# Patient Record
Sex: MALE | Race: WHITE | Employment: FULL TIME | URBAN - METROPOLITAN AREA
[De-identification: names, ages, dates, MRNs, and addresses within clinical notes are randomized per-mention and may not be internally consistent; named-entity substitution may affect disease eponyms.]

---

## 2017-02-03 ENCOUNTER — TELEPHONE (OUTPATIENT)
Dept: INTERNAL MEDICINE CLINIC | Age: 32
End: 2017-02-03

## 2017-03-08 ENCOUNTER — INITIAL CONSULT (OUTPATIENT)
Dept: NEUROLOGY | Age: 32
End: 2017-03-08

## 2017-03-08 VITALS
SYSTOLIC BLOOD PRESSURE: 121 MMHG | HEART RATE: 73 BPM | WEIGHT: 253 LBS | BODY MASS INDEX: 32.47 KG/M2 | OXYGEN SATURATION: 97 % | HEIGHT: 74 IN | DIASTOLIC BLOOD PRESSURE: 84 MMHG

## 2017-03-08 DIAGNOSIS — R20.2 RIGHT HAND PARESTHESIA: Primary | ICD-10-CM

## 2017-03-08 DIAGNOSIS — M79.2 NEURITIS OF UPPER EXTREMITY: ICD-10-CM

## 2017-03-08 PROCEDURE — 99243 OFF/OP CNSLTJ NEW/EST LOW 30: CPT | Performed by: PSYCHIATRY & NEUROLOGY

## 2017-04-04 ENCOUNTER — EMPLOYEE WELLNESS (OUTPATIENT)
Dept: OTHER | Age: 32
End: 2017-04-04

## 2017-04-04 LAB
CHOLESTEROL, TOTAL: 172 MG/DL (ref 0–199)
GLUCOSE BLD-MCNC: 88 MG/DL (ref 70–99)
HDLC SERPL-MCNC: 46 MG/DL (ref 40–60)
LDL CHOLESTEROL CALCULATED: 109 MG/DL
TRIGL SERPL-MCNC: 85 MG/DL (ref 0–150)

## 2017-04-18 ENCOUNTER — HOSPITAL ENCOUNTER (OUTPATIENT)
Dept: NEUROLOGY | Age: 32
Discharge: HOME | End: 2017-04-18
Attending: PSYCHIATRY & NEUROLOGY | Admitting: PSYCHIATRY & NEUROLOGY

## 2017-04-18 LAB
FOLATE: 13.71 NG/ML (ref 4.78–24.2)
VITAMIN B-12: 860 PG/ML (ref 211–911)

## 2017-05-05 ENCOUNTER — OFFICE VISIT (OUTPATIENT)
Dept: NEUROLOGY | Age: 32
End: 2017-05-05

## 2017-05-05 VITALS
DIASTOLIC BLOOD PRESSURE: 83 MMHG | BODY MASS INDEX: 31.44 KG/M2 | OXYGEN SATURATION: 97 % | WEIGHT: 245 LBS | HEIGHT: 74 IN | SYSTOLIC BLOOD PRESSURE: 124 MMHG | HEART RATE: 90 BPM

## 2017-05-05 DIAGNOSIS — M79.2 NEURITIS OF UPPER EXTREMITY: ICD-10-CM

## 2017-05-05 DIAGNOSIS — G56.01 CARPAL TUNNEL SYNDROME OF RIGHT WRIST: ICD-10-CM

## 2017-05-05 DIAGNOSIS — R20.2 RIGHT HAND PARESTHESIA: Primary | ICD-10-CM

## 2017-05-05 PROCEDURE — 99212 OFFICE O/P EST SF 10 MIN: CPT | Performed by: PSYCHIATRY & NEUROLOGY

## 2017-06-05 ENCOUNTER — OFFICE VISIT (OUTPATIENT)
Dept: INTERNAL MEDICINE CLINIC | Age: 32
End: 2017-06-05

## 2017-06-05 ENCOUNTER — TELEPHONE (OUTPATIENT)
Dept: INTERNAL MEDICINE CLINIC | Age: 32
End: 2017-06-05

## 2017-06-05 VITALS
SYSTOLIC BLOOD PRESSURE: 120 MMHG | BODY MASS INDEX: 31.7 KG/M2 | HEIGHT: 74 IN | WEIGHT: 247 LBS | RESPIRATION RATE: 18 BRPM | DIASTOLIC BLOOD PRESSURE: 68 MMHG | HEART RATE: 75 BPM

## 2017-06-05 DIAGNOSIS — R20.0 HAND NUMBNESS: Primary | ICD-10-CM

## 2017-06-05 DIAGNOSIS — M79.2 NEURITIS OF UPPER EXTREMITY: ICD-10-CM

## 2017-06-05 PROCEDURE — 99213 OFFICE O/P EST LOW 20 MIN: CPT | Performed by: INTERNAL MEDICINE

## 2017-06-05 ASSESSMENT — PATIENT HEALTH QUESTIONNAIRE - PHQ9
SUM OF ALL RESPONSES TO PHQ9 QUESTIONS 1 & 2: 0
1. LITTLE INTEREST OR PLEASURE IN DOING THINGS: 0
2. FEELING DOWN, DEPRESSED OR HOPELESS: 0
SUM OF ALL RESPONSES TO PHQ QUESTIONS 1-9: 0

## 2017-06-29 ENCOUNTER — TELEPHONE (OUTPATIENT)
Dept: INTERNAL MEDICINE CLINIC | Age: 32
End: 2017-06-29

## 2017-07-31 ENCOUNTER — OFFICE VISIT (OUTPATIENT)
Dept: ORTHOPEDIC SURGERY | Age: 32
End: 2017-07-31

## 2017-07-31 VITALS
HEIGHT: 74 IN | DIASTOLIC BLOOD PRESSURE: 77 MMHG | SYSTOLIC BLOOD PRESSURE: 122 MMHG | WEIGHT: 248 LBS | HEART RATE: 58 BPM | BODY MASS INDEX: 31.83 KG/M2

## 2017-07-31 DIAGNOSIS — M25.561 ACUTE PAIN OF BOTH KNEES: Primary | ICD-10-CM

## 2017-07-31 DIAGNOSIS — M25.562 ACUTE PAIN OF BOTH KNEES: Primary | ICD-10-CM

## 2017-07-31 PROCEDURE — 73564 X-RAY EXAM KNEE 4 OR MORE: CPT | Performed by: PHYSICIAN ASSISTANT

## 2017-07-31 PROCEDURE — 99203 OFFICE O/P NEW LOW 30 MIN: CPT | Performed by: PHYSICIAN ASSISTANT

## 2017-08-08 ENCOUNTER — TELEPHONE (OUTPATIENT)
Dept: ORTHOPEDIC SURGERY | Age: 32
End: 2017-08-08

## 2017-08-09 ENCOUNTER — OFFICE VISIT (OUTPATIENT)
Dept: ORTHOPEDIC SURGERY | Age: 32
End: 2017-08-09

## 2017-08-09 VITALS — WEIGHT: 248 LBS | BODY MASS INDEX: 31.83 KG/M2 | HEIGHT: 74 IN

## 2017-08-09 DIAGNOSIS — S83.411A SPRAIN OF MEDIAL COLLATERAL LIGAMENT OF RIGHT KNEE, INITIAL ENCOUNTER: ICD-10-CM

## 2017-08-09 DIAGNOSIS — S83.281A TEAR OF LATERAL MENISCUS OF RIGHT KNEE, UNSPECIFIED TEAR TYPE, UNSPECIFIED WHETHER OLD OR CURRENT TEAR, INITIAL ENCOUNTER: ICD-10-CM

## 2017-08-09 DIAGNOSIS — S83.242A TEAR OF MEDIAL MENISCUS OF LEFT KNEE, UNSPECIFIED TEAR TYPE, UNSPECIFIED WHETHER OLD OR CURRENT TEAR, INITIAL ENCOUNTER: Primary | ICD-10-CM

## 2017-08-09 PROCEDURE — 99213 OFFICE O/P EST LOW 20 MIN: CPT | Performed by: ORTHOPAEDIC SURGERY

## 2017-08-09 PROCEDURE — L1812 KO ELASTIC W/JOINTS PRE OTS: HCPCS | Performed by: ORTHOPAEDIC SURGERY

## 2017-09-27 ENCOUNTER — OFFICE VISIT (OUTPATIENT)
Dept: ORTHOPEDIC SURGERY | Age: 32
End: 2017-09-27

## 2017-09-27 VITALS
HEART RATE: 74 BPM | DIASTOLIC BLOOD PRESSURE: 71 MMHG | BODY MASS INDEX: 31.83 KG/M2 | WEIGHT: 248 LBS | SYSTOLIC BLOOD PRESSURE: 128 MMHG | HEIGHT: 74 IN

## 2017-09-27 DIAGNOSIS — M25.562 ACUTE PAIN OF BOTH KNEES: Primary | ICD-10-CM

## 2017-09-27 DIAGNOSIS — M25.561 ACUTE PAIN OF BOTH KNEES: Primary | ICD-10-CM

## 2017-09-27 PROCEDURE — 99214 OFFICE O/P EST MOD 30 MIN: CPT | Performed by: ORTHOPAEDIC SURGERY

## 2018-01-10 ENCOUNTER — OFFICE VISIT (OUTPATIENT)
Dept: ORTHOPEDIC SURGERY | Age: 33
End: 2018-01-10

## 2018-01-10 VITALS — WEIGHT: 248 LBS | HEIGHT: 74 IN | BODY MASS INDEX: 31.83 KG/M2

## 2018-01-10 DIAGNOSIS — S83.281A TEAR OF LATERAL MENISCUS OF RIGHT KNEE, UNSPECIFIED TEAR TYPE, UNSPECIFIED WHETHER OLD OR CURRENT TEAR, INITIAL ENCOUNTER: Primary | ICD-10-CM

## 2018-01-10 PROCEDURE — 99213 OFFICE O/P EST LOW 20 MIN: CPT | Performed by: ORTHOPAEDIC SURGERY

## 2018-02-03 ENCOUNTER — HOSPITAL ENCOUNTER (OUTPATIENT)
Dept: OTHER | Age: 33
Discharge: HOME | End: 2018-02-03
Attending: INTERNAL MEDICINE | Admitting: INTERNAL MEDICINE

## 2018-02-04 LAB
ESTIMATED AVERAGE GLUCOSE: 99.7 MG/DL
ESTRADIOL LEVEL: 15 PG/ML
HBA1C MFR BLD: 5.1 %
HCT VFR BLD CALC: 46.6 % (ref 40.5–52.5)
HEMOGLOBIN: 15.3 G/DL (ref 13.5–17.5)
IGF-1 (INSULIN-LIKE GROWTH I): 203 NG/ML (ref 82–242)
INSULIN-LIKE GROWTH FACTOR-1 Z-SCORE: 1
MCH RBC QN AUTO: 30 PG (ref 26–34)
MCHC RBC AUTO-ENTMCNC: 32.8 G/DL (ref 31–36)
MCV RBC AUTO: 91.5 FL (ref 80–100)
MUV IGG SER QL: 97.6 AU/ML
PDW BLD-RTO: 13.3 % (ref 12.4–15.4)
PLATELET # BLD: 350 K/UL (ref 135–450)
PMV BLD AUTO: 9.4 FL (ref 5–10.5)
PROSTATE SPECIFIC ANTIGEN: 0.26 NG/ML (ref 0–4)
RBC # BLD: 5.1 M/UL (ref 4.2–5.9)
WBC # BLD: 6.8 K/UL (ref 4–11)

## 2018-02-05 LAB — DHEA: 4.95 NG/ML (ref 1.33–7.78)

## 2018-02-06 LAB — MUMPS IGM ANTIBODY: 0.37 IV

## 2018-02-08 LAB
DHEAS (DHEA SULFATE): 232 UG/DL (ref 120–520)
SEX HORMONE BINDING GLOBULIN: 42 NMOL/L (ref 11–80)
TESTOSTERONE FREE-NONMALE: 61.9 PG/ML (ref 47–244)
TESTOSTERONE TOTAL: 359 NG/DL (ref 220–1000)

## 2018-02-28 ENCOUNTER — HOSPITAL ENCOUNTER (OUTPATIENT)
Dept: OTHER | Age: 33
Discharge: HOME | End: 2018-02-28
Attending: INTERNAL MEDICINE | Admitting: INTERNAL MEDICINE

## 2018-03-01 LAB
ESTRADIOL LEVEL: 40 PG/ML
HCT VFR BLD CALC: 42.7 % (ref 40.5–52.5)
HEMOGLOBIN: 14.8 G/DL (ref 13.5–17.5)
MCH RBC QN AUTO: 31.3 PG (ref 26–34)
MCHC RBC AUTO-ENTMCNC: 34.7 G/DL (ref 31–36)
MCV RBC AUTO: 89.9 FL (ref 80–100)
PDW BLD-RTO: 13.3 % (ref 12.4–15.4)
PLATELET # BLD: 269 K/UL (ref 135–450)
PMV BLD AUTO: 9.2 FL (ref 5–10.5)
PROSTATE SPECIFIC ANTIGEN: 0.27 NG/ML (ref 0–4)
RBC # BLD: 4.75 M/UL (ref 4.2–5.9)
WBC # BLD: 8.2 K/UL (ref 4–11)

## 2018-03-03 LAB
SEX HORMONE BINDING GLOBULIN: 30 NMOL/L (ref 11–80)
TESTOSTERONE FREE-NONMALE: 111.4 PG/ML (ref 47–244)
TESTOSTERONE TOTAL: 502 NG/DL (ref 220–1000)

## 2018-03-20 VITALS — WEIGHT: 244 LBS | BODY MASS INDEX: 31.33 KG/M2

## 2018-03-21 ENCOUNTER — HOSPITAL ENCOUNTER (OUTPATIENT)
Dept: OTHER | Age: 33
Discharge: HOME | End: 2018-03-21
Attending: INTERNAL MEDICINE | Admitting: INTERNAL MEDICINE

## 2018-03-21 LAB
BILIRUBIN URINE: ABNORMAL
BLOOD, URINE: ABNORMAL
CLARITY: CLEAR
COLOR: YELLOW
EPITHELIAL CELLS, UA: ABNORMAL /HPF
FOLLICLE STIMULATING HORMONE: 1.1 MIU/ML
GLUCOSE URINE: NEGATIVE MG/DL
HCT VFR BLD CALC: 46.2 % (ref 40.5–52.5)
HEMOGLOBIN: 16 G/DL (ref 13.5–17.5)
KETONES, URINE: 15 MG/DL
LEUKOCYTE ESTERASE, URINE: NEGATIVE
LUTEINIZING HORMONE: 1.2 MIU/ML
MCH RBC QN AUTO: 30.7 PG (ref 26–34)
MCHC RBC AUTO-ENTMCNC: 34.7 G/DL (ref 31–36)
MCV RBC AUTO: 88.6 FL (ref 80–100)
MICROSCOPIC EXAMINATION: YES
MUCUS: ABNORMAL /LPF
NITRITE, URINE: NEGATIVE
PDW BLD-RTO: 13.4 % (ref 12.4–15.4)
PH UA: 6
PLATELET # BLD: 304 K/UL (ref 135–450)
PMV BLD AUTO: 8.3 FL (ref 5–10.5)
PROSTATE SPECIFIC ANTIGEN: 0.29 NG/ML (ref 0–4)
PROTEIN UA: NEGATIVE MG/DL
RBC # BLD: 5.22 M/UL (ref 4.2–5.9)
RBC UA: ABNORMAL /HPF (ref 0–2)
SPECIFIC GRAVITY UA: >=1.03
URINE TYPE: ABNORMAL
UROBILINOGEN, URINE: 0.2 E.U./DL
WBC # BLD: 10.4 K/UL (ref 4–11)
WBC UA: ABNORMAL /HPF (ref 0–5)

## 2018-03-23 LAB — URINE CULTURE, ROUTINE: NORMAL

## 2018-03-24 LAB
SEX HORMONE BINDING GLOBULIN: 37 NMOL/L (ref 11–80)
TESTOSTERONE FREE-NONMALE: 56.3 PG/ML (ref 47–244)
TESTOSTERONE TOTAL: 306 NG/DL (ref 220–1000)

## 2018-03-27 ENCOUNTER — NURSE TRIAGE (OUTPATIENT)
Dept: OTHER | Facility: CLINIC | Age: 33
End: 2018-03-27

## 2018-03-27 ENCOUNTER — TELEPHONE (OUTPATIENT)
Dept: INTERNAL MEDICINE CLINIC | Age: 33
End: 2018-03-27

## 2018-03-27 ENCOUNTER — OFFICE VISIT (OUTPATIENT)
Dept: INTERNAL MEDICINE CLINIC | Age: 33
End: 2018-03-27

## 2018-03-27 VITALS — BODY MASS INDEX: 29.26 KG/M2 | HEIGHT: 74 IN | WEIGHT: 228 LBS

## 2018-03-27 DIAGNOSIS — N48.89 PENIS PAIN: Primary | ICD-10-CM

## 2018-03-27 PROCEDURE — 99212 OFFICE O/P EST SF 10 MIN: CPT | Performed by: INTERNAL MEDICINE

## 2018-03-27 RX ORDER — PREDNISONE 10 MG/1
TABLET ORAL
Qty: 20 TABLET | Refills: 0 | Status: SHIPPED | OUTPATIENT
Start: 2018-03-27 | End: 2018-05-16 | Stop reason: SDUPTHER

## 2018-03-27 NOTE — TELEPHONE ENCOUNTER
Reason for Disposition   Sounds like a serious injury to the triager   Patient sounds very sick or weak to the triager    Protocols used: GENITAL INJURY - MALE-ADULT-AH, NEUROLOGIC DEFICIT-ADULT-AH  Patient on way to ED. Reports injury to groin area early March. Symptoms intensified this evening. Patient woke to SOB, numbness, tingling moving up his spine, hands - red, numb, unable to make a fist.  Unable to have erection. Recommended to proceed to the ED.

## 2018-03-27 NOTE — TELEPHONE ENCOUNTER
----- Message from Fabienne Boyer MD sent at 3/27/2018  2:43 PM EDT -----  Contact: [phone]  Add mobic 15 mg daily  Let me talk to him  ----- Message -----  From: Wolf Zurita  Sent: 3/27/2018  10:16 AM  To: Fabienne Boyer MD    He has been having a lot of lower back pain for 2 1/2 wks now-he was offered an appt . tomm.  But only wanted to see you -wanted to know if you could squeeze him in sometime this afternoon -please let him know at  [phone] appt- [id number]-lr

## 2018-03-28 ENCOUNTER — TELEPHONE (OUTPATIENT)
Dept: INTERNAL MEDICINE CLINIC | Age: 33
End: 2018-03-28

## 2018-03-28 NOTE — TELEPHONE ENCOUNTER
----- Message from Davey Payne MD sent at 3/28/2018  3:23 PM EDT -----  18    ----- Message -----  From: Martha Cornelius  Sent: 3/28/2018   1:54 PM  To: Davey Payne MD    Pharmacy wanting clarify quantity on pt's prednisone. You sent 20 but states pt only needs 18. Ok to change?

## 2018-04-02 RX ORDER — DIAZEPAM 2 MG/1
2 TABLET ORAL EVERY 12 HOURS PRN
Qty: 20 TABLET | Refills: 0 | Status: SHIPPED | OUTPATIENT
Start: 2018-04-02 | End: 2018-04-12

## 2018-05-16 ENCOUNTER — TELEPHONE (OUTPATIENT)
Dept: INTERNAL MEDICINE CLINIC | Age: 33
End: 2018-05-16

## 2018-05-16 RX ORDER — PREDNISONE 10 MG/1
TABLET ORAL
Qty: 20 TABLET | Refills: 0 | Status: SHIPPED | OUTPATIENT
Start: 2018-05-16 | End: 2018-05-31 | Stop reason: DRUGHIGH

## 2018-05-18 ENCOUNTER — EMPLOYEE WELLNESS (OUTPATIENT)
Dept: OTHER | Age: 33
End: 2018-05-18

## 2018-05-19 LAB
CHOLESTEROL, TOTAL: 183 MG/DL (ref 0–199)
GLUCOSE BLD-MCNC: 74 MG/DL (ref 70–99)
HDLC SERPL-MCNC: 53 MG/DL (ref 40–60)
LDL CHOLESTEROL CALCULATED: 117 MG/DL
TRIGL SERPL-MCNC: 66 MG/DL (ref 0–150)

## 2018-05-25 ENCOUNTER — TELEPHONE (OUTPATIENT)
Dept: ENT CLINIC | Age: 33
End: 2018-05-25

## 2018-05-29 VITALS — BODY MASS INDEX: 30.3 KG/M2 | WEIGHT: 236 LBS

## 2018-05-31 ENCOUNTER — OFFICE VISIT (OUTPATIENT)
Dept: ENT CLINIC | Age: 33
End: 2018-05-31

## 2018-05-31 ENCOUNTER — OFFICE VISIT (OUTPATIENT)
Dept: AUDIOLOGY | Age: 33
End: 2018-05-31

## 2018-05-31 VITALS
DIASTOLIC BLOOD PRESSURE: 73 MMHG | HEART RATE: 64 BPM | BODY MASS INDEX: 28.88 KG/M2 | WEIGHT: 225 LBS | HEIGHT: 74 IN | SYSTOLIC BLOOD PRESSURE: 118 MMHG

## 2018-05-31 DIAGNOSIS — H91.23 SUDDEN HEARING LOSS OF BOTH EARS: Primary | ICD-10-CM

## 2018-05-31 DIAGNOSIS — H91.22 SUDDEN IDIOPATHIC HEARING LOSS OF LEFT EAR, UNSPECIFIED HEARING STATUS ON CONTRALATERAL SIDE: Primary | ICD-10-CM

## 2018-05-31 DIAGNOSIS — H93.11 TINNITUS OF RIGHT EAR: Chronic | ICD-10-CM

## 2018-05-31 PROCEDURE — 92550 TYMPANOMETRY & REFLEX THRESH: CPT | Performed by: AUDIOLOGIST

## 2018-05-31 PROCEDURE — 92557 COMPREHENSIVE HEARING TEST: CPT | Performed by: AUDIOLOGIST

## 2018-05-31 PROCEDURE — 99214 OFFICE O/P EST MOD 30 MIN: CPT | Performed by: OTOLARYNGOLOGY

## 2018-05-31 RX ORDER — PREDNISONE 10 MG/1
TABLET ORAL
Qty: 78 TABLET | Refills: 0 | Status: SHIPPED | OUTPATIENT
Start: 2018-05-31 | End: 2020-01-17 | Stop reason: ALTCHOICE

## 2018-06-04 ENCOUNTER — TELEPHONE (OUTPATIENT)
Dept: ENT CLINIC | Age: 33
End: 2018-06-04

## 2018-10-02 ENCOUNTER — TELEPHONE (OUTPATIENT)
Dept: INTERNAL MEDICINE CLINIC | Age: 33
End: 2018-10-02

## 2018-10-02 DIAGNOSIS — M54.9 BACK PAIN, UNSPECIFIED BACK LOCATION, UNSPECIFIED BACK PAIN LATERALITY, UNSPECIFIED CHRONICITY: Primary | ICD-10-CM

## 2018-10-08 ENCOUNTER — OFFICE VISIT (OUTPATIENT)
Dept: INTERNAL MEDICINE CLINIC | Age: 33
End: 2018-10-08

## 2018-10-08 VITALS
BODY MASS INDEX: 31.06 KG/M2 | HEART RATE: 70 BPM | RESPIRATION RATE: 18 BRPM | WEIGHT: 242 LBS | DIASTOLIC BLOOD PRESSURE: 75 MMHG | SYSTOLIC BLOOD PRESSURE: 125 MMHG | HEIGHT: 74 IN

## 2018-10-08 DIAGNOSIS — M79.18 MYALGIA OF PELVIC FLOOR: Primary | ICD-10-CM

## 2018-10-08 DIAGNOSIS — F41.9 ANXIETY: ICD-10-CM

## 2018-10-08 PROCEDURE — 99212 OFFICE O/P EST SF 10 MIN: CPT | Performed by: INTERNAL MEDICINE

## 2018-10-08 RX ORDER — CYCLOBENZAPRINE HCL 5 MG
5 TABLET ORAL DAILY PRN
Qty: 30 TABLET | Refills: 0 | Status: SHIPPED | OUTPATIENT
Start: 2018-10-08 | End: 2018-10-18

## 2019-02-23 ENCOUNTER — HOSPITAL ENCOUNTER (OUTPATIENT)
Age: 34
Discharge: HOME | End: 2019-02-23
Payer: COMMERCIAL

## 2019-02-23 PROCEDURE — 84403 ASSAY OF TOTAL TESTOSTERONE: CPT

## 2019-02-23 PROCEDURE — 84439 ASSAY OF FREE THYROXINE: CPT

## 2019-02-23 PROCEDURE — 83002 ASSAY OF GONADOTROPIN (LH): CPT

## 2019-02-23 PROCEDURE — 83001 ASSAY OF GONADOTROPIN (FSH): CPT

## 2019-02-23 PROCEDURE — 80069 RENAL FUNCTION PANEL: CPT

## 2019-02-23 PROCEDURE — 84153 ASSAY OF PSA TOTAL: CPT

## 2019-02-23 PROCEDURE — 36415 COLL VENOUS BLD VENIPUNCTURE: CPT

## 2019-02-23 PROCEDURE — 84443 ASSAY THYROID STIM HORMONE: CPT

## 2019-02-23 PROCEDURE — 84480 ASSAY TRIIODOTHYRONINE (T3): CPT

## 2019-02-23 PROCEDURE — 85027 COMPLETE CBC AUTOMATED: CPT

## 2019-02-23 PROCEDURE — 84482 T3 REVERSE: CPT

## 2019-02-23 PROCEDURE — 84270 ASSAY OF SEX HORMONE GLOBUL: CPT

## 2019-02-24 LAB
ALBUMIN SERPL-MCNC: 4.5 G/DL (ref 3.4–5)
ANION GAP SERPL CALCULATED.3IONS-SCNC: 12 MMOL/L (ref 3–16)
BUN BLDV-MCNC: 17 MG/DL (ref 7–20)
CALCIUM SERPL-MCNC: 9.5 MG/DL (ref 8.3–10.6)
CHLORIDE BLD-SCNC: 101 MMOL/L (ref 99–110)
CO2: 26 MMOL/L (ref 21–32)
CREAT SERPL-MCNC: 1 MG/DL (ref 0.9–1.3)
FOLLICLE STIMULATING HORMONE: 2 MIU/ML
GFR AFRICAN AMERICAN: >60
GFR NON-AFRICAN AMERICAN: >60
GLUCOSE BLD-MCNC: 86 MG/DL (ref 70–99)
HCT VFR BLD CALC: 45 % (ref 40.5–52.5)
HEMOGLOBIN: 15.2 G/DL (ref 13.5–17.5)
LUTEINIZING HORMONE: 4.3 MIU/ML
MCH RBC QN AUTO: 30.9 PG (ref 26–34)
MCHC RBC AUTO-ENTMCNC: 33.8 G/DL (ref 31–36)
MCV RBC AUTO: 91.4 FL (ref 80–100)
PDW BLD-RTO: 13.2 % (ref 12.4–15.4)
PHOSPHORUS: 3.3 MG/DL (ref 2.5–4.9)
PLATELET # BLD: 231 K/UL (ref 135–450)
PMV BLD AUTO: 9.1 FL (ref 5–10.5)
POTASSIUM SERPL-SCNC: 5.1 MMOL/L (ref 3.5–5.1)
PROSTATE SPECIFIC ANTIGEN: 0.33 NG/ML (ref 0–4)
RBC # BLD: 4.93 M/UL (ref 4.2–5.9)
SODIUM BLD-SCNC: 139 MMOL/L (ref 136–145)
T3 TOTAL: 0.95 NG/ML (ref 0.8–2)
T4 FREE: 1.5 NG/DL (ref 0.9–1.8)
TSH SERPL DL<=0.05 MIU/L-ACNC: 0.93 UIU/ML (ref 0.27–4.2)
WBC # BLD: 5.8 K/UL (ref 4–11)

## 2019-02-27 LAB
SEX HORMONE BINDING GLOBULIN: 44 NMOL/L (ref 11–80)
T3 REVERSE: 17.9 NG/DL (ref 9–27)
TESTOSTERONE FREE-NONMALE: 115.8 PG/ML (ref 47–244)
TESTOSTERONE TOTAL: 635 NG/DL (ref 220–1000)

## 2019-04-03 ENCOUNTER — NURSE TRIAGE (OUTPATIENT)
Dept: OTHER | Facility: CLINIC | Age: 34
End: 2019-04-03

## 2019-04-03 NOTE — TELEPHONE ENCOUNTER
Reason for Disposition   Brief abdominal pain and no symptoms now (e.g., hit in stomach)   Mild abdominal pain    Protocols used: ABDOMINAL INJURY-ADULT-OH, ABDOMINAL PAIN - MALE-ADULT-OH    Patient's location of employment: [employer]  Location of injury: Recovery room  Time of injury: 1500  Last 4 of patient's SSN: [id number]  Location recommended for treatment: home care. 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 Pain was sharp at first now is mild 3/10. He is urinating ok. No pain or swelling in scrotum or penis. Not feeling ill.     He is filling out Safe [employer] and will notify supervisor in AM.

## 2019-05-21 ENCOUNTER — EMPLOYEE WELLNESS (OUTPATIENT)
Dept: OTHER | Age: 34
End: 2019-05-21

## 2019-05-21 LAB
CHOLESTEROL, TOTAL: 134 MG/DL (ref 0–199)
GLUCOSE BLD-MCNC: 79 MG/DL (ref 70–99)
HDLC SERPL-MCNC: 44 MG/DL (ref 40–60)
LDL CHOLESTEROL CALCULATED: 81 MG/DL
TRIGL SERPL-MCNC: 44 MG/DL (ref 0–150)

## 2019-05-28 VITALS — BODY MASS INDEX: 25.29 KG/M2 | WEIGHT: 197 LBS

## 2019-06-10 ENCOUNTER — HOSPITAL ENCOUNTER (OUTPATIENT)
Age: 34
Discharge: HOME | End: 2019-06-10
Payer: COMMERCIAL

## 2019-06-10 LAB
ALBUMIN SERPL-MCNC: 4.3 G/DL (ref 3.4–5)
ANION GAP SERPL CALCULATED.3IONS-SCNC: 13 MMOL/L (ref 3–16)
BUN BLDV-MCNC: 24 MG/DL (ref 7–20)
CALCIUM SERPL-MCNC: 9.3 MG/DL (ref 8.3–10.6)
CHLORIDE BLD-SCNC: 103 MMOL/L (ref 99–110)
CO2: 25 MMOL/L (ref 21–32)
CREAT SERPL-MCNC: 1.1 MG/DL (ref 0.9–1.3)
GFR AFRICAN AMERICAN: >60
GFR NON-AFRICAN AMERICAN: >60
GLUCOSE BLD-MCNC: 96 MG/DL (ref 70–99)
HCT VFR BLD CALC: 44.6 % (ref 40.5–52.5)
HEMOGLOBIN: 14.9 G/DL (ref 13.5–17.5)
MCH RBC QN AUTO: 30.6 PG (ref 26–34)
MCHC RBC AUTO-ENTMCNC: 33.5 G/DL (ref 31–36)
MCV RBC AUTO: 91.5 FL (ref 80–100)
PDW BLD-RTO: 13.9 % (ref 12.4–15.4)
PHOSPHORUS: 3.3 MG/DL (ref 2.5–4.9)
PLATELET # BLD: 248 K/UL (ref 135–450)
PMV BLD AUTO: 8.6 FL (ref 5–10.5)
POTASSIUM SERPL-SCNC: 4.8 MMOL/L (ref 3.5–5.1)
PROSTATE SPECIFIC ANTIGEN: 0.48 NG/ML (ref 0–4)
RBC # BLD: 4.87 M/UL (ref 4.2–5.9)
SODIUM BLD-SCNC: 141 MMOL/L (ref 136–145)
T3 TOTAL: 0.82 NG/ML (ref 0.8–2)
T4 FREE: 1.5 NG/DL (ref 0.9–1.8)
TSH SERPL DL<=0.05 MIU/L-ACNC: 2.45 UIU/ML (ref 0.27–4.2)
WBC # BLD: 6.2 K/UL (ref 4–11)

## 2019-06-10 PROCEDURE — 84153 ASSAY OF PSA TOTAL: CPT

## 2019-06-10 PROCEDURE — 84482 T3 REVERSE: CPT

## 2019-06-10 PROCEDURE — 85027 COMPLETE CBC AUTOMATED: CPT

## 2019-06-10 PROCEDURE — 80069 RENAL FUNCTION PANEL: CPT

## 2019-06-10 PROCEDURE — 36415 COLL VENOUS BLD VENIPUNCTURE: CPT

## 2019-06-10 PROCEDURE — 84439 ASSAY OF FREE THYROXINE: CPT

## 2019-06-10 PROCEDURE — 84443 ASSAY THYROID STIM HORMONE: CPT

## 2019-06-10 PROCEDURE — 84270 ASSAY OF SEX HORMONE GLOBUL: CPT

## 2019-06-10 PROCEDURE — 84403 ASSAY OF TOTAL TESTOSTERONE: CPT

## 2019-06-10 PROCEDURE — 84480 ASSAY TRIIODOTHYRONINE (T3): CPT

## 2019-06-11 ENCOUNTER — OFFICE VISIT (OUTPATIENT)
Dept: ORTHOPEDIC SURGERY | Age: 34
End: 2019-06-11
Payer: COMMERCIAL

## 2019-06-11 VITALS
WEIGHT: 197.09 LBS | SYSTOLIC BLOOD PRESSURE: 129 MMHG | DIASTOLIC BLOOD PRESSURE: 84 MMHG | HEIGHT: 74 IN | BODY MASS INDEX: 25.29 KG/M2 | HEART RATE: 84 BPM

## 2019-06-11 DIAGNOSIS — M54.2 NECK PAIN: Primary | ICD-10-CM

## 2019-06-11 DIAGNOSIS — M54.12 CERVICAL RADICULITIS: ICD-10-CM

## 2019-06-11 PROCEDURE — L0172 CERV COL SR FOAM 2PC PRE OTS: HCPCS | Performed by: PHYSICIAN ASSISTANT

## 2019-06-11 PROCEDURE — 99204 OFFICE O/P NEW MOD 45 MIN: CPT | Performed by: PHYSICIAN ASSISTANT

## 2019-06-11 RX ORDER — PREDNISONE 10 MG/1
TABLET ORAL
Qty: 26 TABLET | Refills: 0 | Status: SHIPPED | OUTPATIENT
Start: 2019-06-11 | End: 2020-01-17 | Stop reason: ALTCHOICE

## 2019-06-11 NOTE — LETTER
[facility] [place] [facility]  [address] [facility] [address]  Phone: [phone]  Fax: [phone]    Daniella Lizzie        June 11, 2019     Patient: Justin Nobles   YOB: 1985   Date of Visit: 6/11/2019       To Whom It May Concern: It is my medical opinion that Justin Nobles should remain out of work until 6-. If you have any questions or concerns, please don't hesitate to call.     Sincerely,        Ninfa Whittington PA-C

## 2019-06-11 NOTE — PROGRESS NOTES
New Patient: SPINE    CHIEF COMPLAINT:    Chief Complaint   Patient presents with    Neck Pain     Pain goes down left arm. 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 He states on Sunday he was in a crawl space and backed out hitting his head on a post which caused \"shooting\" pain into his neck, arms and to his mid thoracic spine. He currently describes aching and burning neck/trap pain with intermittent numbness affecting his left triceps forearm to the hand last 4 digits. Conservative care includes activity modification, NSAIDs, ice. He reports subjective upper and lower extremity weakness. Denies any fine motor difficulty. No gait instability. No lower extremity radiating symptoms. He denies any of these issues prior to this injury. Pain Assessment  Location of Pain: Neck  Severity of Pain: 5  Quality of Pain: Sharp, Dull, Aching  Duration of Pain: Persistent  Frequency of Pain: Constant  Aggravating Factors: Stairs, Walking, Standing, Squatting, Kneeling, Exercise, Straightening, Stretching, Bending  Limiting Behavior: Yes  Relieving Factors: Rest  Result of Injury: No  Work-Related Injury: No  Are there other pain locations you wish to document?: No    The pain assessment was noted & reviewed in the medical record today.      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 , Disp: , Rfl:     predniSONE (DELTASONE) 10 MG tablet, Take, by mouth, in the morning, 6 tabs on day 1-4, 4 tabs on day 5-14, then 2 tabs on day 15-18, then 1 tab on day 19-22, then 0.5 tab on day 23, 24 and 26, then stop (skip day 25). , Disp: 78 tablet, Rfl: 0    gabapentin (NEURONTIN) 100 MG capsule, Take 1 capsule by mouth 3 times daily, Disp: 90 capsule, Rfl: 5  Allergies:  Pcn [penicillins]  Social History:    reports that he has never smoked. He has never used smokeless tobacco. He reports that he drinks alcohol. He reports that he does not use drugs. Family History:   Family History   Problem Relation Age of Onset    Diabetes Neg Hx        REVIEW OF SYSTEMS: Full ROS noted & scanned   CONSTITUTIONAL: Denies unexplained weight loss, fevers, chills or fatigue  NEUROLOGICAL: Denies unsteady gait or progressive weakness  MUSCULOSKELETAL: Denies joint swelling or redness  PSYCHOLOGICAL: Denies anxiety, depression   SKIN: Denies skin changes, delayed healing, rash, itching   HEMATOLOGIC: Denies easy bleeding or bruising  ENDOCRINE: Denies excessive thirst, urination, heat/cold  RESPIRATORY: Denies current dyspnea, cough  GI: Denies nausea, vomiting, diarrhea   : Denies bowel or bladder issues       PHYSICAL EXAM:    Vitals: Blood pressure 129/84, pulse 84, height 6' 2.02\" (1.88 m), weight 197 lb 1.5 oz (89.4 kg). GENERAL EXAM:  · General Apparence: Patient is adequately groomed with no evidence of malnutrition. · Orientation: The patient is oriented to time, place and person.    · Mood & Affect:The patient's mood and affect are appropriate   · Lymphatic: The lymphatic examination bilaterally reveals all areas to be without enlargement or induration  · Sensation: Sensation is intact without deficit  · Coordination/Balance: Good coordination     CERVICAL EXAMINATION:  · Inspection: Local inspection shows no step-off or bruising. Cervical alignment is normal.     · Palpation: Positive for both tenderness at t/o cervical spine at midline and bilateral traps, bilateral trap tightness  · Range of Motion: Moderate loss of flexion moderate to severely limited extension due to pain  · Strength: 5/5 bilateral upper extremities   · Special Tests:    ·   Spurling's, L'Hermitte's & Deelon's negative bilaterally. ·  Cubital and Carpal tunnel Tinel's negative bilaterally. · Skin:There are no rashes, ulcerations or lesions in right & left upper extremities. · Reflexes: Bilaterally triceps, biceps and brachioradialis are 1-2+. Clonus absent bilaterally at the feet. · Additional Examinations:       · RIGHT UPPER EXTREMITY:  Inspection/examination of the right upper extremity does not show any tenderness, deformity or injury. Range of motion is full. There is no gross instability. There are no rashes, ulcerations or lesions. Strength and tone are normal.  · LEFT UPPER EXTREMITY: Inspection/examination of the left upper extremity does not show any tenderness, deformity or injury. Range of motion is full. There is no gross instability. There are no rashes, ulcerations or lesions. Strength and tone are normal.    LUMBAR/SACRAL EXAMINATION:  · Inspection: Local inspection shows no step-off or bruising. Lumbar alignment is normal.  Sagittal and Coronal balance is neutral.      · Palpation:   No evidence of tenderness at the midline. No tenderness bilaterally at the paraspinal or trochanters. There is no step-off or paraspinal spasm. · Strength:   Strength testing is 5/5 in all muscle groups tested. · Special Tests:   Straight leg raise and crossed SLR negative. Leg length and pelvis level.  0 out of 5 Charan's signs. · Skin: There are no rashes, ulcerations or lesions. · Reflexes: Reflexes are symmetrically 1-2+ at the patellar and ankle tendons. Clonus absent bilaterally at the feet. · Gait & station: Normal unassisted  · Additional Examinations:   · RIGHT LOWER EXTREMITY: Inspection/examination of the right lower extremity does not show any tenderness, deformity or injury. Range of motion is full. There is no gross instability. There are no rashes, ulcerations or lesions. Strength and tone are normal.  · LEFT LOWER EXTREMITY:  Inspection/examination of the left lower extremity does not show any tenderness, deformity or injury. Range of motion is full. There is no gross instability. There are no rashes, ulcerations or lesions. Strength and tone are normal.    Diagnostic Testin view cervical spine 2018 there appears to be some widening of the lateral masses on the left side on odontoid view with lucency concerning for fracture. No high-grade instability on flexion extension. 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 Case discussed w/Dr. Gurmeet Mar group. Procedures    Cervical Collar     Patient was prescribed an [place] Cervical Collar. This orthosis is required for the following reasons:    Reduce pain by restricting mobility of the trunk    The patient was educated and fit by a healthcare professional with expert knowledge and specialization in brace application while under the direct supervision of the treating physician. Verbal and written instructions for the use of and application of this item were provided.    They were instructed to contact the office immediately should the brace result in increased pain, decreased sensation, increased swelling or worsening of the condition.          [facility]

## 2019-06-12 ENCOUNTER — TELEPHONE (OUTPATIENT)
Dept: ORTHOPEDIC SURGERY | Age: 34
End: 2019-06-12

## 2019-06-12 DIAGNOSIS — M54.2 NECK PAIN: Primary | ICD-10-CM

## 2019-06-12 LAB — T3 REVERSE: 17.3 NG/DL (ref 9–27)

## 2019-06-12 NOTE — TELEPHONE ENCOUNTER
SPOKE WITH PATIENT AND LET HIM KNOW THAT [facility] REVIEWED HIS CERVICAL MRI AND AGREE WITH IT BEING UNREMARKABLE. HE IS O START PT AND THE ORAL STEROIDS AT THIS TIME.        VOICED UNDERSTANDING

## 2019-06-12 NOTE — TELEPHONE ENCOUNTER
6-11-19. Results reviewed with Anurag over the phone, cervical MRI is unremarkable. Findings also reviewed with Dr. Kandis Hartman further testing is necessary. Recommend PT and Prednisone, f/u 4-6wks for re-evaluation.          Emmanuel Ceballos, [facility]

## 2019-06-13 LAB
SEX HORMONE BINDING GLOBULIN: 60 NMOL/L (ref 11–80)
TESTOSTERONE FREE-NONMALE: 102.2 PG/ML (ref 47–244)
TESTOSTERONE TOTAL: 692 NG/DL (ref 220–1000)

## 2019-06-19 ENCOUNTER — HOSPITAL ENCOUNTER (OUTPATIENT)
Dept: PHYSICAL THERAPY | Age: 34
Setting detail: THERAPIES SERIES
Discharge: HOME | End: 2019-06-19
Payer: COMMERCIAL

## 2019-06-19 PROCEDURE — 97140 MANUAL THERAPY 1/> REGIONS: CPT

## 2019-06-19 PROCEDURE — 97110 THERAPEUTIC EXERCISES: CPT

## 2019-06-19 PROCEDURE — 97163 PT EVAL HIGH COMPLEX 45 MIN: CPT

## 2019-06-19 NOTE — PROGRESS NOTES
[address] [place] [facility]YSICAL [facility]ALUATION      EEvaluation Date: 6/19/2019       Patient Name: Elise Hartmann     YOB: 1985      Medical Diagnosis: neck pain/ cervical strain     ICD 10:  M54.2       Treatment Diagnosis:  Cervical-thoracic hypomobility with decreased cervical functional ROM and associated weakness as well as parathesias in UE and LE following trauma to head/c-spine     Onset Date: June 9 2019       Referral Date: 06/12/2019. Cornel Caldwell Referring Physician:  Melecio Valdez PA-C     Visits Allowed/Insurance/Certification Information:  [employer]      Restrictions/Precautions: none, back to work, no latex or adhesive allergies; monitor abdnormal LE symptoms, caution of manual therapy secondary to mechanism of injury and initial concern of fracture with xrays    Pt's Occupation/Job Duties:  RN full time, in school for NP      Social support/Environment:    Family/caregiver support:     YES Wife and kids, 13-3    Home environment:   two story home  Steps to enter first floor:    2Steps to enter   Steps to second floor:  Full flight of 12-13  Bathroom:    [facility]  Equipment owned:   soft and hard collar    Health History reviewed with pt:   YES      SUBJECTIVE FINDINGS        History of Present Illness:   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 Felt electric shock from neck all the way to mid back. Felt pain, cold sweat, immediate spasm through neck. Had some tingling in L UE and LLE. Went to [facility] because pain was not too bad. Took Motrin, didn't ride any rides. Call MD office and had appointment with Spine PA at [place] 3 days after. Xrays taken, concerned C2 fracture, placed in hard collar, with stat MRI completed same day. Was called back with MRI results with negative results. Was told PA did not see spinal contusion, neck fracture or spinal cord compression. Still have tingling through L side and LE weakness \"feeling off\". Recent intentional weight loss of 60 lbs, has been doing walking program and has noticed more fatigue and swelling in knees and lower legs. 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 Per chart review from 6/11/2019-6/12/2019:  Diagnostic Testing:    \"5 view cervical spine 6/11/2018 there appears to be some widening of the lateral masses on the left side on odontoid view with lucency concerning for fracture. No high-grade instability on flexion extension. Straightening of cervical lordosis  Cervical MRI 2012 was normal   6-11-19. Results reviewed with Anurag over the phone, cervical MRI is unremarkable. Findings also reviewed with Dr. Yfn Concepcion further testing is necessary. Recommend PT and Prednisone, f/u 4-6wks for re-evaluation. Melecio Valdez, PAC\"    MRI results received 6/19/2019 from "[employer]can:   Findings: Cervical vertebral bodies demonstrate normal height and signal intensity. No evidence of neoplastic or metastatic disease in the cervical spine. Pre- and paraspinal soft tissues unremarkable. Craniocervical junction within normal limits. Cervical spinal cord demonstrates normal caliber and signal intensity without evidence of plaque formation, gliosis, or cyst formation. No evidence of sinus inflammation. C2-3,C3-4,C4-5, C5-6, C6-7 and C7-T1 discs unremarkable. No focal disc protrusion/herniation. Neural foramina patent. No central canal stenosis. No posterior facet joint degenerative arthropathy. CONCLUSION: unremarkable MRI of c-spine.        Pain       Patient describes pain to be constant, aching and tingling  Pain location:  Low neck and L side of body (tingling through LUE and LLE), mild pain at base of neck to L thoracic spine  Patient reports pain is    2/10 pain at present   6/10 pain at its worst.  Worsened by  activity, prolonged activity, work and rotating to L  Improved by  nothing and pain medication  Pt. reports pain with coughing, sneezing and laughing:    NO  Pt. reports bowel and bladder changes:      NO  Pt. reports neck crepitus     Current Functional Limitations:   YES  Functional complaints:    Exercising, caring for children, more difficulty with ADLs and IADLs  PLOF:   no functional limitations  Pt's sleep is affected?    YES waking 4-6x/night    Patient goal for therapy:  \"to get rid of numbness and tingling, get neck strong and get back to normal\"        OBJECTIVE FINDINGS      Gait/Steps/Balance       Gait   Level of Assistance needed:     Independent  Gait Deviations (firm surface/linoleum):     None  Assistive Device Used:     No AD    Balance tests      Static Sitting:  Good   Dynamic Sitting: Good   Static Standing:  Good   Dynamic Standing: Good     Hautard's test:    positive with L rotation  - slight LUE drift, no VBI symptoms    Romberg's sign:    []NT   Eyes open:    30 Seconds; negative Sway  Eyes closed:   30 Seconds; negative Sway    Unilateral stance:      []NT   Eyes open Right:    30 Seconds  Eyes open Left:  30 Seconds      Posture:   forward head    Range of Motion   []Cervical Spine   []Thoracic Spine   [] Resisted testing deferred  Range Tested AROM MMT/Resisted Tests   Flexion decreased by 25%    Extension decreased by 75% More pain, pain at C7-T1, more tingling in L side of body   Sidebending Left decreased by 50% Stretch on R, pain at C7-T1   Sidebending Right decreased by 50% Stretch on L   Rotation Left decreased by 25%    Rotation Right decreased by 25%    Comments:     Dermatomes/Myotomes     All strength tested on 5 point scale  LE myotomes 5/5  Dermatomes Left Right Myotomes Left Right   Posterior Head (C2) Impaired WNL Cervical Flexion (C1-2) 4+ 5/5   Posterior Neck (C3) Impaired WNL Cervical Sidebend (C3) 4+ 5/5   Supraclavicular (C4) Impaired WNL Shoulder Shrug (C4) 5/5 5/5   Lateral Upper Arm (C5) [facility] Shoulder Abduction(C5) 4+ 5/5   Lateral Forearm/1st(C6) WNL WNL Elbow flexion (C6) 5/5 5/5   3rd digit (C7) WNL WNL Elbow extension (C7) 4+ 5/5   5th digit (C8)  Impaired WNL Wrist extension (C6) 4+ 5/5   Medial Arm (T1)  Impaired** WNL Wrist flexion (C7) 4+ 5/5      Thumb Extension (C8) 4+ 5/5      Intrinsics (T1)  4+ 5/5   Comments:    Scapular Strength    All strength tested on 5 point scale  Deferred prone testing positions due to acute trauma injury  Scapula Left Right   Upper Trapezius Deferred  Deferred    Middle Trapezius Deferred  Deferred    Lower Trapezius Deferred  Deferred    Rhomboid Deferred  Deferred    Serratus Anterior   Deferred  Deferred    Latissimus Dorsi Deferred  Deferred        Reflexes/Cervical Strength    All strength tested on 5 point scale     Reflex Left Right Strength Strength   Biceps (C5,6) 2+ 2+ Anterior cervical flexors weak   Brachioradialis (C6) 2+ 2+ Lateral musculature weak   Triceps (C7) 0 0     Abductor Digiti Minimi (C8,T1) 0 0     Quadriceps (L3,4) 0 1+     Achilles (S1,2) 2+ 2+     Ankle clonus Negative Negative  More tone on L   Julio's reflex  Negative Negative     Babinski's reflex  NT NT         Flexibility     Muscle Findings   Pectoralis Minor decreased on R and decreased on L   Levator Scapula NT   Scalenes decreased on R and decreased on L   Upper Trapezius  decreased on R and decreased on L   Suboccipitals  WNL   Other:  NT     Special Tests- cervical/thoracic seated     Special Test Findings   [place] c-spine rules  (+) trauma (+) paresthesias - xray already completed    Sharp Nahomi Negative   Vertebral Artery/Positional Provocative Testing Negative   Spurling's/Foraminal Positive on L   Distraction relief noted   Compression  Positive on L   Elevated Arm Stress Test (EAST)  Positive   Thoracic Outlet   Other:  Not tested   Other:  Not tested   Other:  Not tested       Special Tests-supine     Special Test Findings   Alar ligament/ C2 spinous kick test Negative Vertebral artery/Positional provocative testing Not tested   Modified shear test  Negative   Median nerve tension test Not tested   Radial nerve tension test Not tested   Ulnar nerve tension test  Not tested   Other Not tested   Other  Not tested     Specific Joint Mobility Testing/Accessory Motions      Temporomandibular:  NT  Cervical:    hypomobility of and C7  Thoracic:    hypomobility of, T1 and T2  Ribs:     hypomobility of, 1st rib on L and 2nd rib on L  AC joint:    NT  Glenohumeral joint:   NT  Scapulothoracic joint:  NT  Elbow:    NT  Radiocarpal joint:  NT  Radioulnar joint:   NT    Palpation     Patient reported tenderness with palpation:  YES  Location:  L C7-T1    PT notes warmth:       NO  PT notes increased muscle tone:     YES  Location: suboccipitals, upper thoracic and mid cervical paraspinals, B upper trap  PT notes crepitus with palpation:      NO  Ligament tenderness/provocation:      YES  Location: alar testing tenderness  PT notes decreased scar mobility:     N/A    Functional Outcome Measure:     Tested:  YES  Measure Used:  Neck Disability Index  Date Assessed:  6/19/2019  Score:    17/50 (NDI)  % disability:   34 %      ASSESSMENT   Pt is 38 yo Male presenting to OP PT clinic with medical diagnosis of neck pain. Presents today with Cervical-thoracic hypomobility with decreased cervical functional ROM and associated weakness as well as parathesias in UE and LE following trauma to head/c-spine. Would benefit from continued OP PT to address below impairments, improve pain and restore function. Problems        Decreased cervical ROM  Decreased thoracic ROM  Decreased strength  Positive neurological findings  Decreased joint mobility  Increased pain  Decreased flexibility    Rehabilitation Potential:  Good for goals listed below.     Strengths for achieving goals include:  Pt motivated, PLOF, Family Support and Pt cooperative  Limitations for achieving goals include:  none    Prognosis: Good    GOALS    Short Term Goals:    3 weeks Long Term Goals:   6  weeks   1). Establish HEP 1). Pt independent with HEP   2). Pain  3/10 or less 2). Pain  1/10 or less   3). Decrease tingling by 50% 3). Decrease tingling by 95%    4). Pt to subjectively report improved ability to participate in walking program 4). Improve cervical ROM to [facility]   5). 5). Improve UE strength to 5/5 in weak areas   6). 6). PLAN OF CARE     To see patient 1-2x/week for 4-6 weeks for the following treatment interventions:  Therapeutic Exercise  Manual therapy  Neuromuscular Re-ed  Modalities of choice: cold, heat, ultrasound and estim    Thank you for the referral of this patient.       Timed Code Treatment Minutes:     45 minutes     Total Treatment Time:    75 minutes    Julio C Corona PT  License # [id number]

## 2019-06-19 NOTE — FLOWSHEET NOTE
Outpatient Physical Therapy     [x] Daily Treatment Note   [] Progress Note   [] Discharge Note    Date:  6/19/2019    Patient Name:  Angella Bagley         YOB: 1985    Medical Diagnosis: neck pain/ cervical strain     ICD 10:  M54.2       Treatment Diagnosis:  Cervical-thoracic hypomobility with decreased cervical functional ROM with associated weakness as well as parathesias in UE and LE following trauma to head/c-spine     Onset Date: June 9 2019       Referral Date: 06/12/2019. Mike Oswald Referring Physician:  Krzysztof Oliveira PA-C     Visits Allowed/Insurance/Certification Information:  [employer] [facility]      Restrictions/Precautions: none, back to work, no latex or adhesive allergies,monitor abdnormal LE symptoms, caution of manual therapy secondary to mechanism of injury and initial concern of fracture with xrays    Plan of care sent to provider:   [x]NA   []Faxed   []Co-signature       Plan of care signed:    [x]NA   []Yes   []No      Progress Note covers period from (if applicable):    [x]NA    []From          To           Next Progress Note due:   7/17/2019  Visit# / total visits:  1/ 6-12    Plan for Next Session:  Re-assess paresthesias and monitor abnormal LE signs, continued manual traction as tolerated, 1st/2nd rib mobility, scap strength testing, CT junction techniques as needed, cervical strengthening and manual therapy    History of Present Illness:   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 Felt electric shock from neck all the way to mid back. Felt pain, cold sweat, immediate spasm through neck. Had some tingling in L UE and LLE. Went to [facility] because pain was not too bad. Took Motrin, didn't ride any rides. Call MD office and had appointment with Spine PA at [facility] 3 days after. Xrays taken, concerned C2 fracture, placed in hard collar, with stat MRI completed same day.  Was called back with MRI results with negative results. Was told PA did not see spinal contusion, neck fracture or spinal cord compression. Still have tingling through L side and LE weakness \"feeling off\". Recent intentional weight loss of 60 lbs, has been doing walking program and has noticed more fatigue and swelling in knees and lower legs. 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  No high-grade instability on flexion extension.  Straightening of cervical lordosis  Cervical MRI 2012 was normal   6-11-19.  Results reviewed with Anurag over the phone, cervical MRI is unremarkable.  Findings also reviewed with Dr. Griselda Davis further testing is necessary. Recommend PT and Prednisone, f/u 4-6wks for re-evaluation. Amanda Dao, PAC\"  MRI results received 6/19/2019 from [facility]:   Findings: Cervical vertebral bodies demonstrate normal height and signal intensity. No evidence of neoplastic or metastatic disease in the cervical spine. Pre- and paraspinal soft tissues unremarkable. Craniocervical junction within normal limits. Cervical spinal cord demonstrates normal caliber and signal intensity without evidence of plaque formation, gliosis, or cyst formation. No evidence of sinus inflammation. C2-3,C3-4,C4-5, C5-6, C6-7 and C7-T1 discs unremarkable. No focal disc protrusion/herniation. Neural foramina patent. No central canal stenosis. No posterior facet joint degenerative arthropathy. CONCLUSION: unremarkable MRI of c-spine. Subjective:  See eval     Pain level:   AT EVAL:       Objective:       Exercises:    Exercises in bold performed in department today.   Items not bolded are carried forward from prior visits for continuity of the record. Exercise/Equipment Resistance/Repetitions Other comments     Toe peaks X 10 reps  HO given, increase to 2x10 as tolerated     Lateral head lift x10 reps \"     Cervical retraction NV? Scapular strengthening  If indicated NV      Cervical ROM NV                                                                                                    Therapeutic Exercise/Home Exercise Program:   35 minutes  HEP issued. Educated on anatomy, physiology, and biomechanics of c-spine. Pt verbalized understanding and all of patient's questions answered to satisfaction. Educated on central vs peripheral nervous system testing with normal/abnormal results. Educated on safety with returning to work  Educated on importance of appreciating healing process especially for first 3 weeks following traumatic injury. Group Therapy:    0 minutes    Therapeutic Activity:  0 minutes     Gait: 0 minutes    Neuromuscular Re-Education:  0 minutes      Canalith Repositioning Procedure:      Manual Therapy:  15 minutes  Manual traction gentle low cervical  Gentle suboccipital and mid cervical STM  Gentle upper trap STM    Modalities: 0 minutes    Functional Outcome Measure:   []NA  Tested:                        YES  Measure Used:           Neck Disability Index  Date Assessed:           6/19/2019  Score:                          17/50 (NDI)  % disability:                 34 %      Assessment/Treatment/Activity Tolerance:  Improved pain and tingling symptoms at end of session  Patients response to treatment:   [x]Patient tolerated treatment well []Patient limited by fatigue   []Patient limited by pain  []Patient limited by other medical complications   []Other:     Goals:   Progress towards goals:  Established 6/19/2019  Short Term Goals:    3 weeks Long Term Goals:   6  weeks   1). Establish HEP 1). Pt independent with HEP   2). Pain  3/10 or less 2). Pain  1/10 or less   3). Decrease tingling by 50% 3).  Decrease tingling by 95%    4). Pt to subjectively report improved ability to participate in walking program 4). Improve cervical ROM to [facility]   5). 5). Improve UE strength to 5/5 in weak areas   6). 6).         Prognosis: [x]Good   []Fair   []Poor    Patient Requires Follow-up:  [x]Yes  []No    Plan: [x]Plan of care initiated     []Continue per plan of care    [] Alter current plan (see comments)    []Hold pending MD visit []Discharge     Timed Code Treatment Minutes:  45    Total Treatment Minutes:    75    Electronically signed by:  Felicia Morocho [id number]

## 2019-06-25 ENCOUNTER — HOSPITAL ENCOUNTER (OUTPATIENT)
Dept: PHYSICAL THERAPY | Age: 34
Setting detail: THERAPIES SERIES
Discharge: HOME | End: 2019-06-25
Payer: COMMERCIAL

## 2019-06-25 PROCEDURE — 97140 MANUAL THERAPY 1/> REGIONS: CPT

## 2019-06-25 PROCEDURE — 97110 THERAPEUTIC EXERCISES: CPT

## 2019-06-25 NOTE — FLOWSHEET NOTE
Outpatient Physical Therapy     [x] Daily Treatment Note   [] Progress Note   [] Discharge Note    Date:  6/25/2019    Patient Name:  Everardo Beckett         YOB: 1985    Medical Diagnosis: neck pain/ cervical strain     ICD 10:  M54.2       Treatment Diagnosis:  Cervical-thoracic hypomobility with decreased cervical functional ROM with associated weakness as well as parathesias in UE and LE following trauma to head/c-spine     Onset Date: June 9 2019       Referral Date: 06/12/2019. Margret Borja Referring Physician:  Nikolas Dubon PA-C     Visits Allowed/Insurance/Certification Information:  [facility]      Restrictions/Precautions: none, back to work, no latex or adhesive allergies,monitor abdnormal LE symptoms, caution of manual therapy secondary to mechanism of injury and initial concern of fracture with xrays    Plan of care sent to provider:   [x]NA   []Faxed   []Co-signature       Plan of care signed:    [x]NA   []Yes   []No      Progress Note covers period from (if applicable):    [x]NA    []From          To           Next Progress Note due:   7/17/2019  Visit# / total visits:  2/ 6-12    Plan for Next Session:  Re-assess paresthesias and monitor abnormal LE signs, continued manual traction as tolerated, 1st/2nd rib mobility, scap strength testing, CT junction techniques as needed, cervical strengthening and manual therapy    History of Present Illness:   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 Felt electric shock from neck all the way to mid back. Felt pain, cold sweat, immediate spasm through neck. Had some tingling in L UE and LLE. Went to [facility] because pain was not too bad. Took Motrin, didn't ride any rides. Call MD office and had appointment with Spine PA at [facility] 3 days after. Xrays taken, concerned C2 fracture, placed in hard collar, with stat MRI completed same day.  Was called back with still feels stiff and tight in low cervical area. Feels overall symptoms have decreased however doesn't feel it is fast enough with continued concerns. Continues to work with use of lead vests. 2 hrs into shift increased symptoms. Resolved back to baseline pain/tingling once rest  Did report at one point L leg felt very weak as if he was dragging leg at work. This is when he decided to seek further POC/consult from MD.   Reports feeling better after completing exercise    Pain level: 1-4/10 since last visit, 2/10 currently with tingling L hand medially, in middle of back, L foot mostly plantar surface; reports improved symptoms and tingling by end of session  AT [facility]: Patient describes pain to be constant, aching and tingling   Pain location:  Low neck and L side of body (tingling through LUE and LLE), mild pain at base of neck to L thoracic spine   Patient reports pain is     2/10 pain at present    6/10 pain at its worst.    Objective:       Exercises:    Exercises in bold performed in department today. Items not bolded are carried forward from prior visits for continuity of the record. Exercise/Equipment Resistance/Repetitions Other comments     Toe peaks X 10 reps ; reviewed , increase to 2x10 as tolerated;       Lateral head lift x10 reps, reviewed \"     Cervical retraction: forward bend onto knees x10  HO given     Scapular strengthening  X 10 reps HO given     Cervical ROM: flexion,rot, SB Within painfree range to avoid guarding resulting in tightness HO given x 10 reps     Postural re-ed Instructed in proper alignment of head/neck, shoulders and lumbar spine Instructed in awareness especially while working                                                                                            Therapeutic Exercise/Home Exercise Program:   21 minutes  HEP issued, reviewed and revised: see above  Educated on postural re-education: awareness when paresthesias begin and to monitor change in symptoms with change in posture  Instructed in proper body mechanics when working within OR      Scapular strength assessment:  Mid trap: 4/5 B  Low trap: 4-/5 B  Lats: 4+/5    Group Therapy:    0 minutes    Therapeutic Activity:  0 minutes     Gait: 0 minutes    Neuromuscular Re-Education:  0 minutes      Canalith Repositioning Procedure:      Manual Therapy:  30 minutes  Manual traction gentle low cervical: no significant change in symptoms (focus on low cervical C6-T1)  Gentle suboccipital and mid cervical STM  Gentle upper trap STM  Prone PA MFR low thoracic>upper thoracic  Prone PA mobes grade 2 mid thoracic>upper thoracic  Prone PA mobes grade 2-3 CT junction    Modalities: 0 minutes    Functional Outcome Measure:   []NA  Tested:                        YES  Measure Used:           Neck Disability Index  Date Assessed:           6/19/2019  Score:                          17/50 (NDI)  % disability:                 34 %      Assessment/Treatment/Activity Tolerance:  Improved pain and tingling symptoms at end of session  Patients response to treatment:   [x]Patient tolerated treatment well []Patient limited by fatigue   []Patient limited by pain  []Patient limited by other medical complications   []Other:     Goals:   Progress towards goals:  Established 6/19/2019  Short Term Goals:    3 weeks Long Term Goals:   6  weeks   1). Establish HEP 1). Pt independent with HEP   2). Pain  3/10 or less 2). Pain  1/10 or less   3). Decrease tingling by 50% 3). Decrease tingling by 95%    4). Pt to subjectively report improved ability to participate in walking program 4). Improve cervical ROM to [facility]   5). 5). Improve UE strength to 5/5 in weak areas   6). 6).         Prognosis: [x]Good   []Fair   []Poor    Patient Requires Follow-up:  [x]Yes  []No    Plan: []Plan of care initiated     [x]Continue per plan of care    [] Alter current plan (see comments)    []Hold pending MD visit []Discharge     Timed Code Treatment Minutes: 51    Total Treatment Minutes:    51    Electronically signed by:  Eran Melgar, [id number]

## 2019-12-15 ENCOUNTER — HOSPITAL ENCOUNTER (OUTPATIENT)
Age: 34
Discharge: HOME | End: 2019-12-15
Payer: COMMERCIAL

## 2019-12-15 PROCEDURE — 85025 COMPLETE CBC W/AUTO DIFF WBC: CPT

## 2019-12-15 PROCEDURE — 84270 ASSAY OF SEX HORMONE GLOBUL: CPT

## 2019-12-15 PROCEDURE — 84146 ASSAY OF PROLACTIN: CPT

## 2019-12-15 PROCEDURE — 82627 DEHYDROEPIANDROSTERONE: CPT

## 2019-12-15 PROCEDURE — 84482 T3 REVERSE: CPT

## 2019-12-15 PROCEDURE — 84443 ASSAY THYROID STIM HORMONE: CPT

## 2019-12-15 PROCEDURE — 36415 COLL VENOUS BLD VENIPUNCTURE: CPT

## 2019-12-15 PROCEDURE — 80069 RENAL FUNCTION PANEL: CPT

## 2019-12-15 PROCEDURE — 84439 ASSAY OF FREE THYROXINE: CPT

## 2019-12-15 PROCEDURE — 83002 ASSAY OF GONADOTROPIN (LH): CPT

## 2019-12-15 PROCEDURE — 83001 ASSAY OF GONADOTROPIN (FSH): CPT

## 2019-12-15 PROCEDURE — 84153 ASSAY OF PSA TOTAL: CPT

## 2019-12-15 PROCEDURE — 84480 ASSAY TRIIODOTHYRONINE (T3): CPT

## 2019-12-15 PROCEDURE — 80061 LIPID PANEL: CPT

## 2019-12-15 PROCEDURE — 82626 DEHYDROEPIANDROSTERONE: CPT

## 2019-12-15 PROCEDURE — 84403 ASSAY OF TOTAL TESTOSTERONE: CPT

## 2019-12-16 LAB
ALBUMIN SERPL-MCNC: 4.2 G/DL (ref 3.4–5)
ANION GAP SERPL CALCULATED.3IONS-SCNC: 15 MMOL/L (ref 3–16)
BASOPHILS ABSOLUTE: 0.1 K/UL (ref 0–0.2)
BASOPHILS RELATIVE PERCENT: 1 %
BUN BLDV-MCNC: 20 MG/DL (ref 7–20)
CALCIUM SERPL-MCNC: 9.5 MG/DL (ref 8.3–10.6)
CHLORIDE BLD-SCNC: 97 MMOL/L (ref 99–110)
CHOLESTEROL, TOTAL: 180 MG/DL (ref 0–199)
CO2: 26 MMOL/L (ref 21–32)
CREAT SERPL-MCNC: 1.1 MG/DL (ref 0.9–1.3)
EOSINOPHILS ABSOLUTE: 0.2 K/UL (ref 0–0.6)
EOSINOPHILS RELATIVE PERCENT: 2 %
FOLLICLE STIMULATING HORMONE: 1.8 MIU/ML
GFR AFRICAN AMERICAN: >60
GFR NON-AFRICAN AMERICAN: >60
GLUCOSE BLD-MCNC: 74 MG/DL (ref 70–99)
HCT VFR BLD CALC: 45.1 % (ref 40.5–52.5)
HDLC SERPL-MCNC: 53 MG/DL (ref 40–60)
HEMOGLOBIN: 15 G/DL (ref 13.5–17.5)
LDL CHOLESTEROL CALCULATED: 107 MG/DL
LUTEINIZING HORMONE: 2.6 MIU/ML
LYMPHOCYTES ABSOLUTE: 2.3 K/UL (ref 1–5.1)
LYMPHOCYTES RELATIVE PERCENT: 25.5 %
MCH RBC QN AUTO: 30.4 PG (ref 26–34)
MCHC RBC AUTO-ENTMCNC: 33.2 G/DL (ref 31–36)
MCV RBC AUTO: 91.4 FL (ref 80–100)
MONOCYTES ABSOLUTE: 0.7 K/UL (ref 0–1.3)
MONOCYTES RELATIVE PERCENT: 7.5 %
NEUTROPHILS ABSOLUTE: 5.7 K/UL (ref 1.7–7.7)
NEUTROPHILS RELATIVE PERCENT: 64 %
PDW BLD-RTO: 13.9 % (ref 12.4–15.4)
PHOSPHORUS: 3.1 MG/DL (ref 2.5–4.9)
PLATELET # BLD: 246 K/UL (ref 135–450)
PLATELET SLIDE REVIEW: ADEQUATE
PMV BLD AUTO: 8.4 FL (ref 5–10.5)
POTASSIUM SERPL-SCNC: 4.4 MMOL/L (ref 3.5–5.1)
PROLACTIN: 3.9 NG/ML
PROSTATE SPECIFIC ANTIGEN: 0.37 NG/ML (ref 0–4)
RBC # BLD: 4.93 M/UL (ref 4.2–5.9)
RBC # BLD: NORMAL 10*6/UL
SLIDE REVIEW: ABNORMAL
SODIUM BLD-SCNC: 138 MMOL/L (ref 136–145)
T3 TOTAL: 1.31 NG/ML (ref 0.8–2)
T4 FREE: 1.4 NG/DL (ref 0.9–1.8)
TRIGL SERPL-MCNC: 100 MG/DL (ref 0–150)
TSH SERPL DL<=0.05 MIU/L-ACNC: 2.4 UIU/ML (ref 0.27–4.2)
VACUOLATED NEUTROPHILS: PRESENT
VLDLC SERPL CALC-MCNC: 20 MG/DL
WBC # BLD: 8.9 K/UL (ref 4–11)

## 2019-12-17 LAB
DHEAS (DHEA SULFATE): 121 UG/DL (ref 120–520)
SEX HORMONE BINDING GLOBULIN: 55 NMOL/L (ref 11–80)
TESTOSTERONE FREE-NONMALE: 201.8 PG/ML (ref 47–244)
TESTOSTERONE TOTAL: 1126 NG/DL (ref 220–1000)

## 2019-12-18 LAB — DHEA: 2.47 NG/ML (ref 1.33–7.78)

## 2019-12-19 LAB — T3 REVERSE: 18.8 NG/DL (ref 9–27)

## 2020-01-16 ENCOUNTER — TELEPHONE (OUTPATIENT)
Dept: INTERNAL MEDICINE CLINIC | Age: 35
End: 2020-01-16

## 2020-01-16 ENCOUNTER — HOSPITAL ENCOUNTER (OUTPATIENT)
Age: 35
Discharge: HOME | End: 2020-01-16
Payer: COMMERCIAL

## 2020-01-16 LAB
HCT VFR BLD CALC: 43.9 % (ref 40.5–52.5)
HEMOGLOBIN: 14.9 G/DL (ref 13.5–17.5)
MCH RBC QN AUTO: 31.3 PG (ref 26–34)
MCHC RBC AUTO-ENTMCNC: 33.9 G/DL (ref 31–36)
MCV RBC AUTO: 92.3 FL (ref 80–100)
PDW BLD-RTO: 13.3 % (ref 12.4–15.4)
PLATELET # BLD: 274 K/UL (ref 135–450)
PMV BLD AUTO: 7.9 FL (ref 5–10.5)
RBC # BLD: 4.75 M/UL (ref 4.2–5.9)
WBC # BLD: 6.3 K/UL (ref 4–11)

## 2020-01-16 PROCEDURE — 84270 ASSAY OF SEX HORMONE GLOBUL: CPT

## 2020-01-16 PROCEDURE — 84403 ASSAY OF TOTAL TESTOSTERONE: CPT

## 2020-01-16 PROCEDURE — 36415 COLL VENOUS BLD VENIPUNCTURE: CPT

## 2020-01-16 PROCEDURE — 84153 ASSAY OF PSA TOTAL: CPT

## 2020-01-16 PROCEDURE — 85027 COMPLETE CBC AUTOMATED: CPT

## 2020-01-16 NOTE — TELEPHONE ENCOUNTER
----- Message from Leandra Rosario MD sent at 1/16/2020 12:59 PM EST -----  Contact: [phone]  Tomorrow  ----- Message -----  From: Michelle Maynard  Sent: 1/16/2020   9:05 AM EST  To: Leandra Rosario MD    Pt called because he needs to have a H&P either today or tomorrow to enlist in the [employer]. He would like to come in and see you rather than a NP or PA. He said he can come in anytime today or tomorrow if you can squeeze him in. Please advise.      [phone]

## 2020-01-16 NOTE — TELEPHONE ENCOUNTER
----- Message from Uriah Ware MD sent at 1/16/2020 12:59 PM EST -----  Contact: [phone]  Tomorrow  ----- Message -----  From: Danielle Maynard  Sent: 1/16/2020   9:05 AM EST  To: Uriah Ware MD    Pt called because he needs to have a H&P either today or tomorrow to enlist in the [employer]. He would like to come in and see you rather than a NP or PA. He said he can come in anytime today or tomorrow if you can squeeze him in. Please advise.      [phone]

## 2020-01-17 ENCOUNTER — HOSPITAL ENCOUNTER (OUTPATIENT)
Age: 35
Discharge: HOME | End: 2020-01-17
Payer: COMMERCIAL

## 2020-01-17 ENCOUNTER — OFFICE VISIT (OUTPATIENT)
Dept: INTERNAL MEDICINE CLINIC | Age: 35
End: 2020-01-17

## 2020-01-17 VITALS
DIASTOLIC BLOOD PRESSURE: 78 MMHG | HEIGHT: 74 IN | WEIGHT: 215 LBS | HEART RATE: 70 BPM | RESPIRATION RATE: 18 BRPM | BODY MASS INDEX: 27.59 KG/M2 | SYSTOLIC BLOOD PRESSURE: 120 MMHG

## 2020-01-17 PROBLEM — H91.23 SUDDEN HEARING LOSS OF BOTH EARS: Status: RESOLVED | Noted: 2018-05-31 | Resolved: 2020-01-17

## 2020-01-17 PROBLEM — M79.2 NEURITIS OF UPPER EXTREMITY: Status: RESOLVED | Noted: 2017-03-08 | Resolved: 2020-01-17

## 2020-01-17 PROBLEM — H93.11 TINNITUS OF RIGHT EAR: Status: RESOLVED | Noted: 2018-05-31 | Resolved: 2020-01-17

## 2020-01-17 LAB
BILIRUBIN URINE: NEGATIVE
BLOOD, URINE: NEGATIVE
CLARITY: CLEAR
COLOR: YELLOW
GLUCOSE URINE: NEGATIVE MG/DL
KETONES, URINE: ABNORMAL MG/DL
LEUKOCYTE ESTERASE, URINE: NEGATIVE
MICROSCOPIC EXAMINATION: ABNORMAL
NITRITE, URINE: NEGATIVE
PH UA: 6 (ref 5–8)
PROSTATE SPECIFIC ANTIGEN: 0.3 NG/ML (ref 0–4)
PROTEIN UA: NEGATIVE MG/DL
SPECIFIC GRAVITY UA: 1.02 (ref 1–1.03)
URINE TYPE: ABNORMAL
UROBILINOGEN, URINE: 0.2 E.U./DL

## 2020-01-17 PROCEDURE — 81003 URINALYSIS AUTO W/O SCOPE: CPT

## 2020-01-17 PROCEDURE — 99395 PREV VISIT EST AGE 18-39: CPT | Performed by: INTERNAL MEDICINE

## 2020-01-17 ASSESSMENT — ENCOUNTER SYMPTOMS
ABDOMINAL PAIN: 0
NAUSEA: 0
COLOR CHANGE: 0
CONSTIPATION: 0
RHINORRHEA: 0
STRIDOR: 0
SORE THROAT: 0
EYE PAIN: 0
COUGH: 0
DIARRHEA: 0
SHORTNESS OF BREATH: 0
EYE REDNESS: 0
CHEST TIGHTNESS: 0

## 2020-01-17 ASSESSMENT — PATIENT HEALTH QUESTIONNAIRE - PHQ9
2. FEELING DOWN, DEPRESSED OR HOPELESS: 0
SUM OF ALL RESPONSES TO PHQ9 QUESTIONS 1 & 2: 0
SUM OF ALL RESPONSES TO PHQ QUESTIONS 1-9: 0
1. LITTLE INTEREST OR PLEASURE IN DOING THINGS: 0
SUM OF ALL RESPONSES TO PHQ QUESTIONS 1-9: 0
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
## 2020-01-18 LAB — SEX HORMONE BINDING GLOBULIN: 59 NMOL/L (ref 11–80)

## 2020-01-20 ENCOUNTER — HOSPITAL ENCOUNTER (OUTPATIENT)
Age: 35
Discharge: HOME | End: 2020-01-20
Payer: COMMERCIAL

## 2020-01-20 PROCEDURE — 82530 CORTISOL FREE: CPT

## 2020-01-21 LAB — TESTOSTERONE TOTAL: 999 NG/DL (ref 220–1000)

## 2020-01-24 LAB
CORTISOL (UR), FREE: 40.1 UG/D
CORTISOL URINE, FREE (/G CRT): 15.93 UG/G CRT
CORTISOL,F,UG/L,U: 22.3 UG/L
CREATININE 24 HOUR URINE: 2520 MG/D (ref 1000–2500)
CREATININE URINE: 140 MG/DL
HOURS COLLECTED: 24 HR
INTERPRETATION: ABNORMAL
URINE TOTAL VOLUME: 1800 ML

## 2020-02-07 ENCOUNTER — TELEPHONE (OUTPATIENT)
Dept: DERMATOLOGY | Age: 35
End: 2020-02-07

## 2020-02-07 NOTE — TELEPHONE ENCOUNTER
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 He has a few moles that are sore and he also needs a skin check. Looked in his past and has a few no shows. I let him now and he said that his work schedule is crazy.     Call back # [phone]

## 2020-06-09 ENCOUNTER — HOSPITAL ENCOUNTER (OUTPATIENT)
Age: 35
Discharge: HOME | End: 2020-06-09
Payer: COMMERCIAL

## 2020-06-09 PROCEDURE — U0003 INFECTIOUS AGENT DETECTION BY NUCLEIC ACID (DNA OR RNA); SEVERE ACUTE RESPIRATORY SYNDROME CORONAVIRUS 2 (SARS-COV-2) (CORONAVIRUS DISEASE [COVID-19]), AMPLIFIED PROBE TECHNIQUE, MAKING USE OF HIGH THROUGHPUT TECHNOLOGIES AS DESCRIBED BY CMS-2020-01-R: HCPCS

## 2020-06-10 LAB
SARS-COV-2: NOT DETECTED
SOURCE: NORMAL

## 2020-07-13 ENCOUNTER — OFFICE VISIT (OUTPATIENT)
Dept: PRIMARY CARE CLINIC | Age: 35
End: 2020-07-13
Payer: COMMERCIAL

## 2020-07-13 PROCEDURE — 99211 OFF/OP EST MAY X REQ PHY/QHP: CPT | Performed by: NURSE PRACTITIONER

## 2020-07-13 NOTE — PROGRESS NOTES
Naveen Farris received a viral test for COVID-19. They were educated on isolation and quarantine as appropriate. For any symptoms, they were directed to seek care from their PCP, given contact information to establish with a doctor, directed to an urgent care or the emergency room.

## 2020-07-18 LAB
SARS-COV-2: NOT DETECTED
SOURCE: NORMAL

## 2020-07-29 ENCOUNTER — EMPLOYEE WELLNESS (OUTPATIENT)
Dept: OTHER | Age: 35
End: 2020-07-29

## 2020-07-29 LAB
CHOLESTEROL, TOTAL: 151 MG/DL (ref 0–199)
GLUCOSE BLD-MCNC: 82 MG/DL (ref 70–99)
HDLC SERPL-MCNC: 50 MG/DL (ref 40–60)
LDL CHOLESTEROL CALCULATED: 92 MG/DL
TRIGL SERPL-MCNC: 44 MG/DL (ref 0–150)

## 2020-09-08 ENCOUNTER — HOSPITAL ENCOUNTER (OUTPATIENT)
Age: 35
Discharge: HOME | End: 2020-09-08
Payer: COMMERCIAL

## 2020-09-08 LAB
ALBUMIN SERPL-MCNC: 4.3 G/DL (ref 3.4–5)
ALP BLD-CCNC: 58 U/L (ref 40–129)
ALT SERPL-CCNC: 19 U/L (ref 10–40)
ANION GAP SERPL CALCULATED.3IONS-SCNC: 11 MMOL/L (ref 3–16)
AST SERPL-CCNC: 21 U/L (ref 15–37)
BILIRUB SERPL-MCNC: <0.2 MG/DL (ref 0–1)
BILIRUBIN DIRECT: <0.2 MG/DL (ref 0–0.3)
BILIRUBIN, INDIRECT: NORMAL MG/DL (ref 0–1)
BUN BLDV-MCNC: 21 MG/DL (ref 7–20)
CALCIUM SERPL-MCNC: 9.2 MG/DL (ref 8.3–10.6)
CHLORIDE BLD-SCNC: 99 MMOL/L (ref 99–110)
CO2: 26 MMOL/L (ref 21–32)
CORTISOL - AM: 3.8 UG/DL (ref 4.3–22.4)
CREAT SERPL-MCNC: 1.1 MG/DL (ref 0.9–1.3)
ESTRADIOL LEVEL: 77 PG/ML
FOLLICLE STIMULATING HORMONE: 1.4 MIU/ML
GFR AFRICAN AMERICAN: >60
GFR NON-AFRICAN AMERICAN: >60
GLUCOSE BLD-MCNC: 94 MG/DL (ref 70–99)
HCT VFR BLD CALC: 45.8 % (ref 40.5–52.5)
HEMOGLOBIN: 15.3 G/DL (ref 13.5–17.5)
LUTEINIZING HORMONE: 3.2 MIU/ML
MCH RBC QN AUTO: 30.3 PG (ref 26–34)
MCHC RBC AUTO-ENTMCNC: 33.4 G/DL (ref 31–36)
MCV RBC AUTO: 90.8 FL (ref 80–100)
PDW BLD-RTO: 13.3 % (ref 12.4–15.4)
PHOSPHORUS: 3.5 MG/DL (ref 2.5–4.9)
PLATELET # BLD: 226 K/UL (ref 135–450)
PMV BLD AUTO: 9 FL (ref 5–10.5)
POTASSIUM SERPL-SCNC: 4.4 MMOL/L (ref 3.5–5.1)
RBC # BLD: 5.05 M/UL (ref 4.2–5.9)
SODIUM BLD-SCNC: 136 MMOL/L (ref 136–145)
TOTAL PROTEIN: 6.9 G/DL (ref 6.4–8.2)
WBC # BLD: 5.6 K/UL (ref 4–11)

## 2020-09-08 PROCEDURE — 80069 RENAL FUNCTION PANEL: CPT

## 2020-09-08 PROCEDURE — 82670 ASSAY OF TOTAL ESTRADIOL: CPT

## 2020-09-08 PROCEDURE — 36415 COLL VENOUS BLD VENIPUNCTURE: CPT

## 2020-09-08 PROCEDURE — 82533 TOTAL CORTISOL: CPT

## 2020-09-08 PROCEDURE — 85027 COMPLETE CBC AUTOMATED: CPT

## 2020-09-08 PROCEDURE — 80076 HEPATIC FUNCTION PANEL: CPT

## 2020-09-08 PROCEDURE — 84403 ASSAY OF TOTAL TESTOSTERONE: CPT

## 2020-09-08 PROCEDURE — 84270 ASSAY OF SEX HORMONE GLOBUL: CPT

## 2020-09-08 PROCEDURE — 83001 ASSAY OF GONADOTROPIN (FSH): CPT

## 2020-09-08 PROCEDURE — 83002 ASSAY OF GONADOTROPIN (LH): CPT

## 2020-09-10 LAB
SEX HORMONE BINDING GLOBULIN: 59 NMOL/L (ref 11–80)
TESTOSTERONE FREE-NONMALE: 184.7 PG/ML (ref 47–244)
TESTOSTERONE TOTAL: 1097 NG/DL (ref 220–1000)

## 2020-10-02 ENCOUNTER — TELEPHONE (OUTPATIENT)
Dept: ADMINISTRATIVE | Age: 35
End: 2020-10-02

## 2020-10-19 VITALS — BODY MASS INDEX: 28.25 KG/M2 | WEIGHT: 220 LBS

## 2021-01-26 ENCOUNTER — TELEPHONE (OUTPATIENT)
Dept: INTERNAL MEDICINE CLINIC | Age: 36
End: 2021-01-26

## 2021-01-26 NOTE — TELEPHONE ENCOUNTER
Pt needs record of his flu shot from this year. Informed it is not documented in his chart. Pt will contact [facility] for this vaccine record.

## 2021-01-26 NOTE — TELEPHONE ENCOUNTER
----- Message from Sylvia Saleem sent at 1/26/2021 12:00 PM EST -----  Contact: [phone]  Pt would like to  a copy of his shot records.

## 2021-08-16 ENCOUNTER — OFFICE VISIT (OUTPATIENT)
Dept: INTERNAL MEDICINE CLINIC | Age: 36
End: 2021-08-16

## 2021-08-16 VITALS
SYSTOLIC BLOOD PRESSURE: 120 MMHG | DIASTOLIC BLOOD PRESSURE: 70 MMHG | RESPIRATION RATE: 18 BRPM | HEIGHT: 74 IN | WEIGHT: 218 LBS | BODY MASS INDEX: 27.98 KG/M2 | HEART RATE: 70 BPM

## 2021-08-16 DIAGNOSIS — H60.542 ECZEMA OF LEFT EXTERNAL EAR: Primary | ICD-10-CM

## 2021-08-16 PROCEDURE — 99212 OFFICE O/P EST SF 10 MIN: CPT | Performed by: INTERNAL MEDICINE

## 2021-08-16 NOTE — PROGRESS NOTES
Fariha Miller (:  1985) is a 43 y.o. male,Established patient, here for evaluation of the following chief complaint(s):  Nodule      SUBJECTIVE/OBJECTIVE:  HPI     43 y.o. male with no major medical issues here for left ear lesion   Notes prominent skin behind the left ear lobe , no itching or pain . Suspects LN swelling  No ear pain or ear drainage. No trauma. Currently working as a nurse at [employer]  Non smoker      Allergies   Allergen Reactions    Pcn [Penicillins] Shortness Of Breath    Flu Virus Vaccine      Diffuse myalgias         Review of Systems       Objective   Physical Exam      Vitals:    21 1633   BP: 120/70   Pulse: 70   Resp: 18         General: young tall healthy male,  Awake, alert and oriented. Appears to be not in any distress  Mucous Membranes:  Pink , anicteric  HEENT - left auricle with scaly rash behind the ear and prominent skin swelling behind ear lobule. No LN noted. No mastoid tenderness   MM clear. TM normal.  No Submandibular LN palpable  Neck: No JVD, no carotid bruit, no thyromegaly  Chest:  Clear to auscultation bilaterally, no added sounds  Cardiovascular:  RRR S1S2 heard, no murmurs or gallops  Abdomen:  Soft, undistended, non tender, no organomegaly, BS present  Extremities: No edema or cyanosis. Distal pulses well felt  Neurological : grossly normal    Wt Readings from Last 3 Encounters:   21 218 lb (98.9 kg)   20 220 lb (99.8 kg)   20 215 lb (97.5 kg)        Diagnosis Orders   1. Eczema of left external ear         suspect rash and skin swelling with eczema , add local steroid cream  Call for any worsening      An electronic signature was used to authenticate this note.     --Gama Keita MD

## 2022-02-21 ENCOUNTER — TELEPHONE (OUTPATIENT)
Dept: INTERNAL MEDICINE CLINIC | Age: 37
End: 2022-02-21

## 2022-02-21 NOTE — TELEPHONE ENCOUNTER
----- Message from Patrick Graff MD sent at 2/21/2022  4:56 PM EST -----  Contact: [phone] (H)  Make it on Wednesday  ----- Message -----  From: Brenden Villegas  Sent: 2/21/2022   2:48 PM EST  To: Patrick Graff MD    No openings next week. Please advise.  ----- Message -----  From: Aleta Angeles  Sent: 2/21/2022   2:28 PM EST  To: Brenden Villegas      ----- Message -----  From: Patrick Graff MD  Sent: 2/21/2022   2:12 PM EST  To: Aleta Angeles    Next week  ----- Message -----  From: Pascual Collins  Sent: 2/21/2022  12:03 PM EST  To: Patrick Graff MD    Pt called and stated that that he will be joining the [employer] and that he will need a physical to do so. He will need one as soon as possible. When would you like him scheduled?     Thank you

## 2022-02-23 NOTE — TELEPHONE ENCOUNTER
Pt states he has work on Wednesday and will not be able to come in. Pt scheduled for tomorrow with Nilesh Peoples.

## 2022-03-07 ENCOUNTER — TELEPHONE (OUTPATIENT)
Dept: INTERNAL MEDICINE CLINIC | Age: 37
End: 2022-03-07

## 2022-03-07 NOTE — TELEPHONE ENCOUNTER
----- Message from Lis Rodriguez MD sent at 3/7/2022  1:04 PM EST -----  Contact: Cindy Shah [phone]  No I will see him, overbook 1245 pm  ----- Message -----  From: Yelena Perez  Sent: 3/7/2022  12:41 PM EST  To: Lis Rodriguez MD    You do not have any openings tomorrow, do you want pt to schedule for tomorrow with Mary Fearing?  ----- Message -----  From: Lis Rodriguez MD  Sent: 3/7/2022  12:35 PM EST  To: Marcelina Gallo  ----- Message -----  From: Nilay Jefferson  Sent: 3/7/2022  12:23 PM EST  To: Lis Rodriguez MD    Patient stated that he needs to get a physical for work as soon as possible.

## 2022-03-08 ENCOUNTER — TELEPHONE (OUTPATIENT)
Dept: INTERNAL MEDICINE CLINIC | Age: 37
End: 2022-03-08

## 2022-03-08 NOTE — TELEPHONE ENCOUNTER
----- Message from Elly Beck MD sent at 3/8/2022 10:41 AM EST -----  Contact: Wiliam David [phone]  Yes  ----- Message -----  From: Akanksha Hanley  Sent: 3/8/2022   7:47 AM EST  To: lEly Beck MD    Pt cannot come in today. You have some same day openings next week, do you want to see if pt could come in for any of those?  ----- Message -----  From: Elly Beck MD  Sent: 3/7/2022   1:05 PM EST  To: Akanksha Hanley    No I will see him, overbook [phone]  ----- Message -----  From: Akanksha Hanley  Sent: 3/7/2022  12:41 PM EST  To: Elly Beck MD    You do not have any openings tomorrow, do you want pt to schedule for tomorrow with [facility]?  ----- Message -----  From: Elly Beck MD  Sent: 3/7/2022  12:35 PM EST  To: Indiana Hull  ----- Message -----  From: Hazel Bender  Sent: 3/7/2022  12:23 PM EST  To: Elly Beck MD    Patient stated that he needs to get a physical for work as soon as possible.